# Patient Record
Sex: FEMALE | Race: AMERICAN INDIAN OR ALASKA NATIVE | URBAN - NONMETROPOLITAN AREA
[De-identification: names, ages, dates, MRNs, and addresses within clinical notes are randomized per-mention and may not be internally consistent; named-entity substitution may affect disease eponyms.]

---

## 2017-01-13 ENCOUNTER — HOSPITAL - GICH (OUTPATIENT)
Dept: LAB | Facility: OTHER | Age: 27
End: 2017-01-13

## 2017-01-13 ENCOUNTER — AMBULATORY - GICH (OUTPATIENT)
Dept: INTERNAL MEDICINE | Facility: OTHER | Age: 27
End: 2017-01-13

## 2017-01-13 ENCOUNTER — COMMUNICATION - GICH (OUTPATIENT)
Dept: INTERNAL MEDICINE | Facility: OTHER | Age: 27
End: 2017-01-13

## 2017-01-13 DIAGNOSIS — N30.00 ACUTE CYSTITIS WITHOUT HEMATURIA: ICD-10-CM

## 2017-01-13 DIAGNOSIS — R10.9 ABDOMINAL PAIN: ICD-10-CM

## 2017-01-13 LAB
BACTERIA URINE: ABNORMAL BACTERIA/HPF
BILIRUB UR QL: NEGATIVE
CLARITY, URINE: CLEAR CLARITY
COLOR UR: YELLOW COLOR
EPITHELIAL CELLS: ABNORMAL EPI/HPF
GLUCOSE URINE: NEGATIVE MG/DL
KETONES UR QL: NEGATIVE MG/DL
LEUKOCYTE ESTERASE URINE: ABNORMAL
NITRITE UR QL STRIP: POSITIVE
OCCULT BLOOD,URINE - HISTORICAL: NEGATIVE
PH UR: >=9 [PH]
PROTEIN QUALITATIVE,URINE - HISTORICAL: ABNORMAL MG/DL
RBC - HISTORICAL: ABNORMAL /HPF
SP GR UR STRIP: 1.01
TRIPLE PHOSPHATE CRYSTALS - HISTORICAL: PRESENT
UROBILINOGEN,QUALITATIVE - HISTORICAL: NORMAL EU/DL
WBC - HISTORICAL: ABNORMAL /HPF

## 2017-01-15 LAB
CULTURE - HISTORICAL: ABNORMAL
CULTURE - HISTORICAL: ABNORMAL
SUSCEPTIBILITY RESULT - HISTORICAL: ABNORMAL

## 2017-01-25 ENCOUNTER — HISTORY (OUTPATIENT)
Dept: INTERNAL MEDICINE | Facility: OTHER | Age: 27
End: 2017-01-25

## 2017-01-25 ENCOUNTER — OFFICE VISIT - GICH (OUTPATIENT)
Dept: INTERNAL MEDICINE | Facility: OTHER | Age: 27
End: 2017-01-25

## 2017-01-25 ENCOUNTER — AMBULATORY - GICH (OUTPATIENT)
Dept: SCHEDULING | Facility: OTHER | Age: 27
End: 2017-01-25

## 2017-01-25 DIAGNOSIS — R26.89 OTHER ABNORMALITIES OF GAIT AND MOBILITY: ICD-10-CM

## 2017-01-25 DIAGNOSIS — G93.1 ANOXIC BRAIN DAMAGE, NOT ELSEWHERE CLASSIFIED (CODE): ICD-10-CM

## 2017-01-25 DIAGNOSIS — B18.2 CHRONIC VIRAL HEPATITIS C (H): ICD-10-CM

## 2017-01-25 DIAGNOSIS — M46.28: ICD-10-CM

## 2017-01-25 DIAGNOSIS — Z23 ENCOUNTER FOR IMMUNIZATION: ICD-10-CM

## 2017-01-25 DIAGNOSIS — Z96.21 COCHLEAR IMPLANT STATUS: ICD-10-CM

## 2017-01-25 DIAGNOSIS — N31.9 NEUROMUSCULAR DYSFUNCTION OF BLADDER: ICD-10-CM

## 2017-01-25 DIAGNOSIS — K21.9 GASTRO-ESOPHAGEAL REFLUX DISEASE WITHOUT ESOPHAGITIS: ICD-10-CM

## 2017-01-26 ENCOUNTER — AMBULATORY - GICH (OUTPATIENT)
Dept: SCHEDULING | Facility: OTHER | Age: 27
End: 2017-01-26

## 2017-01-29 ENCOUNTER — AMBULATORY - GICH (OUTPATIENT)
Dept: SCHEDULING | Facility: OTHER | Age: 27
End: 2017-01-29

## 2017-01-30 ENCOUNTER — AMBULATORY - GICH (OUTPATIENT)
Dept: SCHEDULING | Facility: OTHER | Age: 27
End: 2017-01-30

## 2017-01-31 ENCOUNTER — HISTORY (OUTPATIENT)
Dept: INTERNAL MEDICINE | Facility: OTHER | Age: 27
End: 2017-01-31

## 2017-02-01 ENCOUNTER — COMMUNICATION - GICH (OUTPATIENT)
Dept: INTERNAL MEDICINE | Facility: OTHER | Age: 27
End: 2017-02-01

## 2017-02-02 ENCOUNTER — COMMUNICATION - GICH (OUTPATIENT)
Dept: INTERNAL MEDICINE | Facility: OTHER | Age: 27
End: 2017-02-02

## 2017-02-03 ENCOUNTER — COMMUNICATION - GICH (OUTPATIENT)
Dept: INTERNAL MEDICINE | Facility: OTHER | Age: 27
End: 2017-02-03

## 2017-02-06 ENCOUNTER — COMMUNICATION - GICH (OUTPATIENT)
Dept: INTERNAL MEDICINE | Facility: OTHER | Age: 27
End: 2017-02-06

## 2017-03-23 ENCOUNTER — AMBULATORY - GICH (OUTPATIENT)
Dept: GERIATRICS | Facility: OTHER | Age: 27
End: 2017-03-23

## 2017-03-23 DIAGNOSIS — H91.93 HEARING LOSS OF BOTH EARS: ICD-10-CM

## 2017-04-13 ENCOUNTER — AMBULATORY - GICH (OUTPATIENT)
Dept: SCHEDULING | Facility: OTHER | Age: 27
End: 2017-04-13

## 2018-01-03 NOTE — TELEPHONE ENCOUNTER
Patient Information     Patient Name MRN Sex Jacqui Joel [id number] Female 1990      Telephone Encounter by Maribel Bangura at 2017  1:47 PM     Author:  Maribel Bangura Service:  (none) Author Type:  (none)     Filed:  2017  1:48 PM Encounter Date:  2017 Status:  Signed     :  Maribel Bangura at [facility] states Patient's father Jeferson did talk with Zahraa earlier today.  They do not have the home care orders completed at this time and are hoping for Wednesday.  [place] will call with any questions or concerns or if there is anything we can be of assistance with.    Maribel Bangura LPN..................2017  1:48 PM

## 2018-01-03 NOTE — TELEPHONE ENCOUNTER
"Patient Information     Patient Name MRN Sex Jacqui Joel [id number] Female 1990      Telephone Encounter by Maribel Bangura at 2017  8:41 AM     Author:  Maribel Bangura Service:  (none) Author Type:  (none)     Filed:  2017  8:46 AM Encounter Date:  2017 Status:  Signed     :  Maribel Bangura and Trina,  at [facility] Friday is still the planned discharge date for patient.  They are still waiting for the assessment to be completed by [facility].  Call placed to patient's father Jeferson.  He states, \"I want the assessment done or she should not leave.\"  He lives in [place] and patient will be going to [facility] [place] by [facility].  She will not be in [place].  Notification being sent to provider.    Maribel Bangura LPN..................2017  8:45 AM          "

## 2018-01-03 NOTE — TELEPHONE ENCOUNTER
Patient Information     Patient Name MRN Sex Jacqui Joel [id number] Female 1990      Telephone Encounter by Maribel Bangura at 2017  3:09 PM     Author:  Maribel Bangura Service:  (none) Author Type:  (none)     Filed:  2017  3:44 PM Encounter Date:  2017 Status:  Signed     :  Maribel Bangura            Zahraa at [facility] called with confirmation that patient would be moving out tomorrow, 17 to live with her Father Jeferson.  She is going to double check on that since he did not say that when we talked yesterday.  Notified her Yani ROMAN Crane DO has discharge orders to sign and will be faxing them tomorrow.    Maribel Bangura LPN..................2017  3:19 PM

## 2018-01-03 NOTE — H&P
Patient Information     Patient Name MRN Sex Jacqui Joel [id number] Female 1990      H&P by Yani Crane DO at 2017  9:30 AM     Author:  Yani Crane DO Service:  (none) Author Type:  PHYS- Osteopathic     Filed:  2017  7:41 AM Encounter Date:  2017 Status:  Signed     :  Yani Crane DO (PHYS- Osteopathic)            Chief Complaint     Patient presents with       [facility] Care      with Dr. Crane       HPI: Ms. Mane is a 26 y.o. female who presents today to \[facility]\"" care.  She is accompanied by her father who provides most of the history given her difficulty with hearing and speech.  Additionally over 60 pages of records were obtained from [facility] and are available and reviewed and summarized below.     Jacqui has a very complicated medical history over the past 2.5 years.  She was diagnosed with tricuspid valve endocarditis in 2015 and underwent TVR along with PFO closure.  This was felt to be due to IV drug use. That episode was complicated by septic pulm emboli and splenic infarction. She then had an opioid overdose in 2016 and spent multiple months in the hospital during which time she was noted to have repeat endocarditis and underwent prolong treatment with IV antibiotics.  This prolonged hospitalization was complicated by intracranial hemorrhage that left her with extremity weakness. She developed hearing loss in summer 2016 and this was felt to be due to antibiotic use in the form of Vancomycin +/- Gentamicin.  This past August she redeveloped fevers and was found to have osteomyelitis with some concern for septic joints.  She was noted to have continued vegetation on her prosthetic tricuspid valve however they were stable from previous echocardiograms and her blood cultures were repeatedly negative.  She has continued to follow with Neurology, Cardiology, ID and ENT through [facility].  Her most recent blood work was in late December  for fungal serology titers.    Today her father tells me that she continues to live at [facility] and they are looking at ways to get her home with him in [facility].  He is concerned as he does not believe the necessary arrangements are in place and does not want to be forced to take her home prematurely.  [facility] did send paperwork for discharge today.  Overall he feels she is doing well and is able to continue work with PT.  She does communicate with him via text and witting and he reports that she overall does well at getting herself around and typically only needs help getting up and ready for the day.  He is looking into assistance for PCA/home aide for this.  She can get around with a regular walker without wheels as the wheeled walkers move to fast for her however most of the time is in her wheelchair due to balance issues and lower extremity weakness.    She does have some chronic pain in her low back and is regularly on Tramadol.  This was last filled 1/9/2017 per the  report by one of my partners.  She uses this 1-2 times daily and overall feels this is adequate.    Vitals from NH are reviewed today and show blood pressure is low normal, heart rate and resp rate are normal and she has been afebrile over the past month.  In reviewing her meds from the NH she has not received any of the PRN albuterol, maalox, lactulose, senna-s, miralax, zofran or trazodone and her father believes these should be discontinued.  He also does not feels she is getting any benefit from melatonin, lidocaine patch or pepcid at this point and would like these discontinued also.    In reviewing her medical issues,    (Z96.21) Cochlear implant status - she is undergoing evaluation for implant due to hearing loss and needs PCV and Meningococcal vaccines.  She is due for repeat MRI to assess which side would be more appropriate and successful to implant.  The most recent note from [facility] reports that vestibular testing does show that  her balance has also been significantly impacted in addition to hearing and it is unclear how much benefit the implant will give her for this.    (N31.9) Neurogenic bladder decondary to hypoxic brain injury, intermittent catheterization - she has had recurrent UTIs that have been successfully treated with cephalosporins.  She denies any symptoms at this time.    (M46.28) Osteomyelitis of sacroiliac region (HC) - although it is not entirely clear what follow up she has for this she is seeing [facility] ID regularly and did undergo prolonged antibiotics this past fall with concern for osteomyelitis vs endocarditis through [facility].  She does not have any recurrent fevers at this time and no indication of infection otherwise.      (B18.2) Chronic hepatitis C without hepatic coma (HC) - she is following with ID who will consider treatment in future once other medication issues have resolved and in meantime will monitor for liver damage    (K21.9) Chronic GERD - she has had improvement and no longer feels pepcid or other antacids are necessary.  She is on a baby aspirin daily    History is discussed on 1/25/2017 with patient and reviewed in previous available records by myself.  It is current to the best of my knowledge as below.    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     albuterol (PROVENTIL) 0.083 % neb solution Inhale 2.5 mg via a nebulizer every 6 hours if needed.     ASPIRIN (ASPIR-81 ORAL) Take 81 mg by mouth once daily.     cholecalciferol (VITAMIN D) 1,000 unit tablet Take 1,000 Units by mouth once daily.     diphenhydrAMINE (BENADRYL ALLERGY) 12.5 mg/5 mL liquid Take 10 mL by mouth every 6 hours if needed.     famotidine (PEPCID) 20 mg tablet Take 20 mg by mouth 2 times daily.     ferrous sulfate, 65 mg elemental, 324 mg (65 mg iron) Delayed-Release tablet Take 324 mg by mouth once daily.     fluconazole (DIFLUCAN) 150 mg tablet      lactulose (KRISTALOSE) 20 gram packet Take 20 g by mouth 3 times daily.     lidocaine 5% (LIDODERM) 5 % patch Apply  on dry, clean, hairless skin. Apply 1 patch to painful area of skin for up to 12 hours within a 24-hour period.     miconazole nitrate (MONISTAT 7) 2 % vaginal cream APPLY 1 APPICATOR VAGINALLY HS FOR 7 DAYS     NYSTOP powder      traMADol (ULTRAM) 50 mg tablet Take 50 mg by mouth every 6 hours if needed.     traZODone (DESYREL) 50 mg tablet Take 50 mg by mouth at bedtime.     Walker Non rolling walker - wheeled walker does not work yet     Wheel Chair Wheelchair: lt wt  with leg rests: Swing away Length of need: 99 months     No current facility-administered medications for this visit.      Medications have been reviewed by me and are current to the best of my knowledge and ability.       Allergies     Allergen  Reactions     Fentanyl Hives     Gentamicin Hives     Latex Rash     Sulfa (Sulfonamide Antibiotics) Rash     Trazodone Nausea And Vomiting and Dizziness        Family History      Problem  Relation Age of Onset     Diabetes Father      Hypertension Father      No Known Problems Mother        Family Status     Relation  Status     Father Alive  "    Mother Alive         Social History     Social History        Marital status:  Single     Spouse name: N/A     Number of children:  N/A     Years of education:  N/A     Social History Main Topics         Smoking status:   Former Smoker     Smokeless tobacco:   Never Used      Comment: socially      Alcohol use   No     Drug use:   No      Comment: history of heroin use      Sexual activity:   Not Currently     Other Topics  Concern     Not on file      Social History Narrative     Currently living at [facility], likely going home to live with father long term.          ROS  history is taken from patient's father so full review of systems is limited.  He denies any obvious symptoms including fevers, chills, complaint of pain currently although she does occasionally take tramadol for pain, constipation, diarrhea, blood in the stool or current urinary symptoms.       EXAM:   /66  Pulse 72  Resp 20  Ht 1.6 m (5' 3\")  Wt 67.6 kg (149 lb 2 oz)  Breastfeeding? No  BMI 26.42 kg/m2  Estimated body mass index is 26.42 kg/(m^2) as calculated from the following:    Height as of this encounter: 1.6 m (5' 3\").    Weight as of this encounter: 67.6 kg (149 lb 2 oz).   GEN: Vitals reviewed.  Healthy appearing. Patient is in no acute distress. Cooperative with exam.  HEENT: Normocephalic atraumatic.  Normal external eye, conjunctiva, lids, cornea with no scleral icterus or conjunctival erythema. Pupils equally round. Oropharynx with no erythema or exudates. Dentition adequate.    NECK: Supple; no thyromegaly or masses noted.  No cervical or supraclavicular lymphadenopathy.  CV: Heart regular in rate and rhythm with no murmur.  Radial and posterior tibial pulses palpable.  LUNGS: Lungs clear to auscultation bilaterally.  Chest rise equal bilaterally.  No accessory muscle use.  ABD:  Soft, nontender, and nondistended.  No rebound. Bowel sounds positive.  SKIN: Warm and dry to touch.  No rash on face, arms and " legs.  EXT: No clubbing or cyanosis.  No peripheral edema.  NEURO: Alert and oriented to person.  Cranial nerves II-XII grossly intact with no focal or lateralizing deficits.  Muscle tone normal in bilateral upper extremities.  Decreased in the lower extremities.  No tremor. Sensation intact to light touch.  MSK: ROM of upper and lower ext symmetric.  PSYCH: Mood is good. Affect appropriate.  Due to hearing issues is unable to participate in conversation Joya.       ASSESSMENT AND PLAN:    1. Hypoxic brain injury (HC)  - patient continues to have a some cognitive difficulties however it is difficult to determine the extent of these due her hearing loss and weakness from previous stroke.  She does appear alert and able to communicate.  I do believe she is able to communicate well with her father and would do okay going home with him as long as assistance is available.      2. Cochlear implant status  - continue to follow with [facility], vaccines given today  - MENINGOCOCCAL (MENACTRA) VACC IM  - PREVNAR 13 (AKA PNEUMOCOCCAL VACCINE 13-VALENT IM)    3. Balance problems  - patient will need to continue to PT/OT and have assistance at home with both equipment in the form of walker and wheelchair.  Her father does report that she has a wheeled walker but she needs a non-wheeled walker as she is not ready to move fast enough to keep up with the 4ww.  She does need a lightweight wheel chair in order to self propel and maneuver through her fathers home once they get back to [facility].  - Walker; Non rolling walker - wheeled walker does not work yet  Dispense: 1 Device; Refill: 0  - Wheel Chair; Wheelchair: lt wt  with leg rests: Swing away Length of need: 99 months  Dispense: 1 Device; Refill: 0    4. Neurogenic bladder decondary to hypoxic brain injury, intermittent catheterization  - likely will need to continue this life long and will need to be able to intermittently cath or have assistance with catheter cares at  home    5. Osteomyelitis of sacroiliac region (HC)  - continue to follow up with ID at [facility] and she is to notify us or them if she has any recurrent fevers    6. Chronic hepatitis C without hepatic coma (HC)  - liver enzymes checked last in November along with other labs and they all looked okay through [facility]    7. Chronic GERD  - okay to discontinue meds as patient is not using and continue as needed  - Encouraged to avoid caffeine, NSAIDS, chocolate, alcohol, spicy food, red sauce; consider raising the head of bed 10-15 degrees; do not eat within 2-3 hours of bedtime  - Return/call as needed for follow-up should any new symptoms develop, for worsening of current symptoms or if symptoms do not resolve with above plan.    8. Chronic low back pain  - currently on Tramadol which at this time is okay to continue as NSAIDS are contraindicated due to significant co-mobidities however with history of narcotics addiction would be cautious with this and monitor for escalating doses   - encouraged tylenol as needed  - continue with PT/OT and regular exercises    Need for Menactra vaccination  - [place] ADMIN VACC INITIAL  - MENINGOCOCCAL (MENACTRA) VACC IM    Need for vaccination with 13-polyvalent pneumococcal conjugate vaccine  - [place] ADMIN EA ADDL VACC  - PREVNAR 13 (AKA PNEUMOCOCCAL VACCINE 13-VALENT IM)    Several medications discontinued as above today.  They will need to monitor for constipation    I will wait with discharge paperwork for Jacqui until she is closer to the date of discharge in case other changes are needed prior to going home and also to be sure support services are in place.        Return if symptoms worsen or fail to improve.    A total of 46 minutes spent with in face-to-face consultation of this patient with greater than 50% spent in counseling and care coordination of above listed medical problems including diagnosis, treatment options with emphasis on risks and benefits of each, prognosis and  importance of compliance for each.       JANIYA PUGA DO   1/25/2017 10:33 AM

## 2018-01-03 NOTE — TELEPHONE ENCOUNTER
Patient Information     Patient Name MRN Sex Jacqui Joel [id number] Female 1990      Telephone Encounter by Yani Crane DO at 2017  5:10 PM     Author:  Yani Crane DO Service:  (none) Author Type:  PHYS- Osteopathic     Filed:  2017  5:10 PM Encounter Date:  2017 Status:  Signed     :  Yani Crane DO (PHYS- Osteopathic)            Paperwork signed with several medication discontinuation as discussed in our recent visit.  Ready to be faxed back to [facility].

## 2018-01-03 NOTE — PROGRESS NOTES
Patient Information     Patient Name MRN Sex Jacqui Joel [id number] Female 1990      Progress Notes by Yani Crane DO at 2017  9:30 AM     Author:  Yani Crane DO Service:  (none) Author Type:  PHYS- Osteopathic     Filed:  2017  7:41 AM Encounter Date:  2017 Status:  Signed     :  Yani Crane DO (PHYS- Osteopathic)            Please see H&P from same date.

## 2018-01-03 NOTE — MISCELLANEOUS
"Patient Information     Patient Name MRN Sex Jacqui Joel [id number] Female 1990      DME - Progress Note by Yani Crane DO at 2017  9:30 AM     Author:  Yani Crane DO Service:  (none) Author Type:  PHYS- Osteopathic     Filed:  2017  7:41 AM Encounter Date:  2017 Status:  Signed     :  Yani Crane DO (PHYS- Osteopathic)                Patient Name:  Jacqui Mane  :  1990  Date: 2017     Patient diagnosis:  (G93.1) Hypoxic brain injury (HC)  (primary encounter diagnosis)  (Z96.21) Cochlear implant status  (R26.89) Balance problems  (N31.9) Neurogenic bladder decondary to hypoxic brain injury, intermittent catheterization  (M46.28) Osteomyelitis of sacroiliac region (HC)  (B18.2) Chronic hepatitis C without hepatic coma (HC)  (K21.9) Chronic GERD  (Z23) Need for Menactra vaccination  (Z23) Need for vaccination with 13-polyvalent pneumococcal conjugate vaccine    Wheelchair:  Home Medical Equipment Orders Summary for Wheelchair for Home Use    Height: 160 cm (5' 3\")  Wt.: 67.6 kg (149 lb 2 oz)     Patient has mobility limitations significantly impairing the ability it participate in activites of daily living in customary locations in the home. Patient's mobility limitation cannot be resolved by use of fitted cane or walker. Patient and/or caregiver can safely use a manual wheelchair. Patient's functional mobility deficit can be sufficiently resolved by the use of a manual wheelchair. Patient cannot self-propel a standard wheelchair using arms and/or legs. and Patient can self-propel a lighweight wheelchair.          "

## 2018-01-03 NOTE — TELEPHONE ENCOUNTER
Patient Information     Patient Name MRN Sex Jacqui Joel [id number] Female 1990      Telephone Encounter by Maribel Bangura at 2/3/2017 10:05 AM     Author:  Maribel Bangura Service:  (none) Author Type:  (none)     Filed:  2/3/2017 10:06 AM Encounter Date:  2017 Status:  Signed     :  Maribel Bangura            Faxed discharge paperwork to [facility] for processing.  Maribel Bangura LPN..................2/3/2017  10:05 AM

## 2018-01-03 NOTE — TELEPHONE ENCOUNTER
Patient Information     Patient Name MRN Sex Jacqui Joel [id number] Female 1990      Telephone Encounter by Yani Crane DO at 2017  4:26 PM     Author:  Yani Crane DO Service:  (none) Author Type:  PHYS- Osteopathic     Filed:  2017  4:37 PM Encounter Date:  2017 Status:  Signed     :  Yani Crane DO (PHYS- Osteopathic)            Patient with a UTI.  I will send an antibiotics.  She needs to come in and be seen for establish care visit set on .  I will add on a culture and we will call with results when available.

## 2018-01-03 NOTE — TELEPHONE ENCOUNTER
Patient Information     Patient Name MRN Sex Jacqui Joel [id number] Female 1990      Telephone Encounter by Deidra Melo NP at 1/15/2017  9:30 AM     Author:  Deidra Melo NP Service:  (none) Author Type:  PHYS- Nurse Practitioner     Filed:  1/15/2017  9:31 AM Encounter Date:  2017 Status:  Signed     :  Deidra Melo NP (PHYS- Nurse Practitioner)            Weekend coverage of labs.  Urine culture grew out Proteus Mirabililis.  Currently on Ceftin, susceptible.  DEIDRA MELO NP ....................  1/15/2017   9:31 AM

## 2018-01-03 NOTE — TELEPHONE ENCOUNTER
Patient Information     Patient Name MRN Sex Jacqui Joel [id number] Female 1990      Telephone Encounter by Maribel Bangura at 2/3/2017  1:37 PM     Author:  Maribel Bangura Service:  (none) Author Type:  (none)     Filed:  2/3/2017  1:38 PM Encounter Date:  2/3/2017 Status:  Signed     :  Maribel Bangura            Faxed updated discharge paperwork signed by DO crissy Meigrwarren Sanchez.  Maribel Bangura LPN..................2/3/2017  1:38 PM

## 2018-01-03 NOTE — TELEPHONE ENCOUNTER
Patient Information     Patient Name MRN Sex Jacqui Joel [id number] Female 1990      Telephone Encounter by Maribel Bangura at 2/3/2017 10:02 AM     Author:  Maribel Bangura Service:  (none) Author Type:  (none)     Filed:  2/3/2017 10:03 AM Encounter Date:  2017 Status:  Signed     :  Maribel Bangura            Faxed discharge paperwork to [facility] for processing.  Maribel Bangura LPN..................2/3/2017  10:02 AM

## 2018-01-03 NOTE — TELEPHONE ENCOUNTER
Patient Information     Patient Name MRN Sex Jacqui Joel [id number] Female 1990      Telephone Encounter by Yani Crane DO at 2017  1:03 PM     Author:  Yani Crane DO Service:  (none) Author Type:  PHYS- Osteopathic     Filed:  2017  1:08 PM Encounter Date:  2017 Status:  Signed     :  Yani Crane DO (PHYS- Osteopathic)            [facility] called, discussed with Zahraa.  They did see an assessment from [facility].  Home care is set up and waiting for orders.  They will notify father of assessment.      We will fax over orders tomorrow.

## 2018-01-03 NOTE — NURSING NOTE
Patient Information     Patient Name MRN Sex Jacqui Joel [id number] Female 1990      Nursing Note by Maribel Bangura at 2017  9:30 AM     Author:  Maribel Bangura Service:  (none) Author Type:  (none)     Filed:  2017  9:59 AM Encounter Date:  2017 Status:  Signed     :  Maribel Bangura            Patient presents to clinic with her father Jeferson for establish care with Dr. Crane.  Experiencing lower back and between shoulders pain.    Maribel Bangura LPN..................2017  9:51 AM

## 2018-01-03 NOTE — TELEPHONE ENCOUNTER
Patient Information     Patient Name MRN Sex Jacqui Joel [id number] Female 1990      Telephone Encounter by Elizabeth Grayson LPN at 2017  1:14 PM     Author:  Elizabeth Grayson LPN Service:  (none) Author Type:  NURS- Licensed Practical Nurse     Filed:  2017  1:25 PM Encounter Date:  2017 Status:  Signed     :  Elizabeth Grayson LPN (NURS- Licensed Practical Nurse)            Contacted the patients dad loraine, he stated that he has been unable to contact alayna shore today about his daughter being discharged. He also stated that the [facility] services have not received any paper work or assessments and no services are set up for the patient. [facility] also does not do in home physical therapy and the patients father was told this would be set up as well for the patient before she is discharged. Currently there is no physical therapy set up anywhere. He also stated there is no proof that a assessment that was suppose to be completed by [facility] back in 2016.  Elizabeth Grayson LPN......2017  1:25 PM

## 2018-01-04 NOTE — PROGRESS NOTES
Patient Information     Patient Name MRN Sex Jacqui Joel [id number] Female 1990      Progress Notes by Roberta Avilez NP at 3/23/2017  4:00 AM     Author:  Roberta Avilez NP Service:  (none) Author Type:  PHYS- Nurse Practitioner     Filed:  3/28/2017  7:58 AM Encounter Date:  3/23/2017 Status:  Signed     :  Roberta Avilez NP (PHYS- Nurse Practitioner)            This note has been dictated. The encounter number is [phone].

## 2018-01-04 NOTE — H&P
Patient Information     Patient Name MRN Sex     Jacqui Mane [id number] Female 1990      H&P signed by Roberta Avilez NP at 3/28/2017  7:16 AM      Author:  Roberta Avilez NP Service:  (none) Author Type:  PHYS- Nurse Practitioner     Filed:  3/28/2017  7:16 AM Encounter Date:  3/23/2017 Status:  Signed     :  Roberta Avilez NP (PHYS- Nurse Practitioner)            -  DATE OF SERVICE:  2017    ELLA AGUILAR     CHIEF COMPLAINT   60-day recertification.     HISTORY OF PRESENT ILLNESS  Jacqui is seen today for recertification. She was seen by [facility] cardiology on . Her father was concerned that her heart was declining. Upon reviewing cardiology notes, it appears that she had some mild congestive heart failure, with increased weight and edema; but that KENNY was obtained, and was stable when compared to 2016. This patient does have a history of endocarditis, and has had tricuspid valve replacement. She had a history of IV drug use. She also has hearing loss from receiving gentamicin. She is planning to have cochlear implants. The surgery has not yet been scheduled. She reports no complaints or concerns today. Nursing staff report overall she is doing well.     While the patient was at [facility] she was also having diarrhea. A week prior to that she had been treated with Cipro for a UTI which was diagnosed in the emergency department. She then developed the diarrhea, and was found to have C. difficile colitis. She has now finished the vancomycin, and is no longer having diarrhea.     HISTORY   Past medical history, allergies, and medications are reviewed. Complete review of systems discussed with nursing staff and resident. Negative except for positive findings mentioned in HPI.     PHYSICAL EXAMINATION  GENERAL: Pleasant female, lying in bed. She was sleeping, but did awaken easily.   VITAL SIGNS: Blood pressure 110/66. Pulse 72.  Respiratory rate 14. Temperature 97.2. SPO2 96% on room air.   SKIN: Color pink.   HEENT: Mucous membranes moist. Sclerae are anicteric.   NECK: Supple, without adenopathy.   LUNGS: Mckay clear to auscultation throughout.   CARDIOVASCULAR: Regular, no S3 auscultated.   ABDOMEN: Soft and without masses, tenderness, and organomegaly.   EXTREMITIES: Trace ankle edema.     Cardiology notes from 2017 reviewed.     ASSESSMENT  1. Endocarditis.   2. Valvular heart disease.   3. Status post tricuspid valve replacement.   4. History of IV drug use.   5. Hearing loss.   6. C. difficile colitis.   7. Congestive heart failure.     PLAN  Recommend that patient follow with cardiology and ENT as scheduled. She will need to be monitored closely for return of C. difficile colitis and worsening of heart failure. If she develops loose stools, then C. difficile needs to be obtained once again. And if she has increasing weight, shortness of breath, or edema, then cardiology needs to be updated.       CLAU RIVERO   D:  2017 12:05:00  T:  2017 13:14:32  Voice Job ID:  [id number]  Text Job ID:  [id number]  2017;mdw    cc:NURSING HOME  JANIYA PUGA DO, PRIMARY PHYSICIAN         [facility] [place], [place]NAME:  GLEN LANI R  MR#:  [id number]  :  1990  DATE:  2017  LOCATION:  [facility]  ROOM:    TYPE:  CLINURSING HOME REPORTPage 1 of 2

## 2018-01-26 ENCOUNTER — DOCUMENTATION ONLY (OUTPATIENT)
Dept: FAMILY MEDICINE | Facility: OTHER | Age: 28
End: 2018-01-26

## 2018-01-26 VITALS
BODY MASS INDEX: 26.42 KG/M2 | WEIGHT: 149.13 LBS | HEART RATE: 72 BPM | HEIGHT: 63 IN | DIASTOLIC BLOOD PRESSURE: 66 MMHG | RESPIRATION RATE: 20 BRPM | SYSTOLIC BLOOD PRESSURE: 102 MMHG

## 2018-01-26 RX ORDER — ACETAMINOPHEN 325 MG/1
650 TABLET ORAL EVERY 6 HOURS PRN
COMMUNITY

## 2018-01-26 RX ORDER — FLUCONAZOLE 150 MG/1
TABLET ORAL
COMMUNITY
Start: 2016-11-19

## 2018-01-26 RX ORDER — ASPIRIN 81 MG/1
81 TABLET ORAL DAILY
COMMUNITY

## 2018-01-26 RX ORDER — FERROUS SULFATE 324(65)MG
324 TABLET, DELAYED RELEASE (ENTERIC COATED) ORAL DAILY
COMMUNITY

## 2018-01-26 RX ORDER — NYSTATIN 100000 [USP'U]/G
POWDER TOPICAL
COMMUNITY
Start: 2016-10-10

## 2018-01-26 RX ORDER — DIPHENHYDRAMINE HCL 12.5MG/5ML
10 LIQUID (ML) ORAL EVERY 6 HOURS PRN
COMMUNITY

## 2018-01-26 RX ORDER — MICONAZOLE NITRATE 2 %
CREAM WITH APPLICATOR VAGINAL
COMMUNITY
Start: 2016-12-04

## 2018-01-26 RX ORDER — TRAMADOL HYDROCHLORIDE 50 MG/1
50 TABLET ORAL EVERY 6 HOURS PRN
COMMUNITY

## 2018-03-25 ENCOUNTER — HEALTH MAINTENANCE LETTER (OUTPATIENT)
Age: 28
End: 2018-03-25